# Patient Record
Sex: FEMALE | Race: AMERICAN INDIAN OR ALASKA NATIVE
[De-identification: names, ages, dates, MRNs, and addresses within clinical notes are randomized per-mention and may not be internally consistent; named-entity substitution may affect disease eponyms.]

---

## 2019-09-05 ENCOUNTER — HOSPITAL ENCOUNTER (EMERGENCY)
Dept: HOSPITAL 5 - ED | Age: 34
Discharge: HOME | End: 2019-09-05
Payer: MEDICAID

## 2019-09-05 VITALS — DIASTOLIC BLOOD PRESSURE: 94 MMHG | SYSTOLIC BLOOD PRESSURE: 132 MMHG

## 2019-09-05 DIAGNOSIS — F20.9: ICD-10-CM

## 2019-09-05 DIAGNOSIS — S80.212A: Primary | ICD-10-CM
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
## 2019-09-05 DIAGNOSIS — L03.116: ICD-10-CM
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
## 2019-09-05 DIAGNOSIS — M25.462: ICD-10-CM

## 2019-09-05 DIAGNOSIS — F17.200: ICD-10-CM
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
## 2019-09-05 LAB
ALBUMIN SERPL-MCNC: 3.4 G/DL (ref 3.9–5)
ALT SERPL-CCNC: 7 UNITS/L (ref 7–56)
BASOPHILS # (AUTO): 0.1 K/MM3 (ref 0–0.1)
BASOPHILS NFR BLD AUTO: 0.9 % (ref 0–1.8)
BUN SERPL-MCNC: 9 MG/DL (ref 7–17)
BUN/CREAT SERPL: 15 %
CALCIUM SERPL-MCNC: 8.9 MG/DL (ref 8.4–10.2)
EOSINOPHIL # BLD AUTO: 0.1 K/MM3 (ref 0–0.4)
EOSINOPHIL NFR BLD AUTO: 1.7 % (ref 0–4.3)
HCT VFR BLD CALC: 36.2 % (ref 30.3–42.9)
HEMOLYSIS INDEX: 30
HGB BLD-MCNC: 12.2 GM/DL (ref 10.1–14.3)
LYMPHOCYTES # BLD AUTO: 2.1 K/MM3 (ref 1.2–5.4)
LYMPHOCYTES NFR BLD AUTO: 28 % (ref 13.4–35)
MCHC RBC AUTO-ENTMCNC: 34 % (ref 30–34)
MCV RBC AUTO: 93 FL (ref 79–97)
MONOCYTES # (AUTO): 0.7 K/MM3 (ref 0–0.8)
MONOCYTES % (AUTO): 9.3 % (ref 0–7.3)
PLATELET # BLD: 450 K/MM3 (ref 140–440)
RBC # BLD AUTO: 3.91 M/MM3 (ref 3.65–5.03)

## 2019-09-05 PROCEDURE — 80053 COMPREHEN METABOLIC PANEL: CPT

## 2019-09-05 PROCEDURE — 96372 THER/PROPH/DIAG INJ SC/IM: CPT

## 2019-09-05 PROCEDURE — 73562 X-RAY EXAM OF KNEE 3: CPT

## 2019-09-05 PROCEDURE — 85025 COMPLETE CBC W/AUTO DIFF WBC: CPT

## 2019-09-05 PROCEDURE — 36415 COLL VENOUS BLD VENIPUNCTURE: CPT

## 2019-09-05 PROCEDURE — 99283 EMERGENCY DEPT VISIT LOW MDM: CPT

## 2019-09-05 NOTE — XRAY REPORT
LEFT KNEE 3 VIEW(S)



INDICATION / CLINICAL INFORMATION:

knee pain with history of left leg fracture.



COMPARISON:

None available.

 

FINDINGS:



BONES / JOINT(S): There is a mildly displaced osteochondral fracture involving the articular surface 
of the medial femoral condyle. Fracture appears subacute to chronic in appearance. There is an old, h
ealed fracture of the proximal left fibular shaft. Patient is subjectively osteopenic. Small moderate
 joint effusion.



SOFT TISSUES: Mild to moderate anteromedial soft tissue swelling.



ADDITIONAL FINDINGS: None.







Signer Name: BENIGNO Ruiz MD 

Signed: 9/5/2019 12:34 PM

 Workstation Name: [id number]
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
## 2019-09-05 NOTE — EMERGENCY DEPARTMENT REPORT
Blank Doc





- Documentation


Documentation: 





This is a 33-year-old female that presents with left knee pain and swelling. A

lso has some redness and open wound to area.





This initial assessment/diagnostic orders/clinical plan/treatment(s) is/are 

subject to change based on patient's health status, clinical progression and re-

assessment by fellow clinical providers in the ED.  Further treatment and workup

at subsequent clinical providers discretion.  Patient/guardians urged not to 

elope from the ED as their condition may be serious if not clinically assessed 

and managed.  Initial orders include:


1- Patient sent to [facility] for further evaluation and treatment


2- xray